# Patient Record
Sex: FEMALE | Race: ASIAN | NOT HISPANIC OR LATINO | URBAN - METROPOLITAN AREA
[De-identification: names, ages, dates, MRNs, and addresses within clinical notes are randomized per-mention and may not be internally consistent; named-entity substitution may affect disease eponyms.]

---

## 2019-05-20 ENCOUNTER — EMERGENCY (EMERGENCY)
Facility: HOSPITAL | Age: 24
LOS: 1 days | Discharge: HOME | End: 2019-05-20
Attending: EMERGENCY MEDICINE | Admitting: EMERGENCY MEDICINE
Payer: MEDICAID

## 2019-05-20 VITALS
DIASTOLIC BLOOD PRESSURE: 70 MMHG | OXYGEN SATURATION: 100 % | TEMPERATURE: 98 F | RESPIRATION RATE: 16 BRPM | SYSTOLIC BLOOD PRESSURE: 115 MMHG | HEART RATE: 95 BPM

## 2019-05-20 DIAGNOSIS — R69 ILLNESS, UNSPECIFIED: ICD-10-CM

## 2019-05-20 DIAGNOSIS — F43.25 ADJUSTMENT DISORDER WITH MIXED DISTURBANCE OF EMOTIONS AND CONDUCT: ICD-10-CM

## 2019-05-20 LAB
ALBUMIN SERPL ELPH-MCNC: 4.2 G/DL — SIGNIFICANT CHANGE UP (ref 3.3–5)
ALP SERPL-CCNC: 102 U/L — SIGNIFICANT CHANGE UP (ref 40–120)
ALT FLD-CCNC: 16 U/L — SIGNIFICANT CHANGE UP (ref 4–33)
ANION GAP SERPL CALC-SCNC: 12 MMO/L — SIGNIFICANT CHANGE UP (ref 7–14)
APAP SERPL-MCNC: < 15 UG/ML — LOW (ref 15–25)
AST SERPL-CCNC: 14 U/L — SIGNIFICANT CHANGE UP (ref 4–32)
BASOPHILS # BLD AUTO: 0.04 K/UL — SIGNIFICANT CHANGE UP (ref 0–0.2)
BASOPHILS NFR BLD AUTO: 0.3 % — SIGNIFICANT CHANGE UP (ref 0–2)
BILIRUB SERPL-MCNC: 0.2 MG/DL — SIGNIFICANT CHANGE UP (ref 0.2–1.2)
BUN SERPL-MCNC: 13 MG/DL — SIGNIFICANT CHANGE UP (ref 7–23)
CALCIUM SERPL-MCNC: 9.6 MG/DL — SIGNIFICANT CHANGE UP (ref 8.4–10.5)
CHLORIDE SERPL-SCNC: 102 MMOL/L — SIGNIFICANT CHANGE UP (ref 98–107)
CO2 SERPL-SCNC: 24 MMOL/L — SIGNIFICANT CHANGE UP (ref 22–31)
CREAT SERPL-MCNC: 0.74 MG/DL — SIGNIFICANT CHANGE UP (ref 0.5–1.3)
EOSINOPHIL # BLD AUTO: 0.18 K/UL — SIGNIFICANT CHANGE UP (ref 0–0.5)
EOSINOPHIL NFR BLD AUTO: 1.4 % — SIGNIFICANT CHANGE UP (ref 0–6)
ETHANOL BLD-MCNC: < 10 MG/DL — SIGNIFICANT CHANGE UP
GLUCOSE SERPL-MCNC: 115 MG/DL — HIGH (ref 70–99)
HCT VFR BLD CALC: 35.5 % — SIGNIFICANT CHANGE UP (ref 34.5–45)
HGB BLD-MCNC: 11.3 G/DL — LOW (ref 11.5–15.5)
IMM GRANULOCYTES NFR BLD AUTO: 0.5 % — SIGNIFICANT CHANGE UP (ref 0–1.5)
LYMPHOCYTES # BLD AUTO: 1.86 K/UL — SIGNIFICANT CHANGE UP (ref 1–3.3)
LYMPHOCYTES # BLD AUTO: 14.2 % — SIGNIFICANT CHANGE UP (ref 13–44)
MAGNESIUM SERPL-MCNC: 2.2 MG/DL — SIGNIFICANT CHANGE UP (ref 1.6–2.6)
MCHC RBC-ENTMCNC: 26.5 PG — LOW (ref 27–34)
MCHC RBC-ENTMCNC: 31.8 % — LOW (ref 32–36)
MCV RBC AUTO: 83.3 FL — SIGNIFICANT CHANGE UP (ref 80–100)
MONOCYTES # BLD AUTO: 0.93 K/UL — HIGH (ref 0–0.9)
MONOCYTES NFR BLD AUTO: 7.1 % — SIGNIFICANT CHANGE UP (ref 2–14)
NEUTROPHILS # BLD AUTO: 9.99 K/UL — HIGH (ref 1.8–7.4)
NEUTROPHILS NFR BLD AUTO: 76.5 % — SIGNIFICANT CHANGE UP (ref 43–77)
NRBC # FLD: 0 K/UL — SIGNIFICANT CHANGE UP (ref 0–0)
PHOSPHATE SERPL-MCNC: 3 MG/DL — SIGNIFICANT CHANGE UP (ref 2.5–4.5)
PLATELET # BLD AUTO: 337 K/UL — SIGNIFICANT CHANGE UP (ref 150–400)
PMV BLD: 9.9 FL — SIGNIFICANT CHANGE UP (ref 7–13)
POTASSIUM SERPL-MCNC: 4.1 MMOL/L — SIGNIFICANT CHANGE UP (ref 3.5–5.3)
POTASSIUM SERPL-SCNC: 4.1 MMOL/L — SIGNIFICANT CHANGE UP (ref 3.5–5.3)
PROT SERPL-MCNC: 8.3 G/DL — SIGNIFICANT CHANGE UP (ref 6–8.3)
RBC # BLD: 4.26 M/UL — SIGNIFICANT CHANGE UP (ref 3.8–5.2)
RBC # FLD: 14.8 % — HIGH (ref 10.3–14.5)
SALICYLATES SERPL-MCNC: < 5 MG/DL — LOW (ref 15–30)
SODIUM SERPL-SCNC: 138 MMOL/L — SIGNIFICANT CHANGE UP (ref 135–145)
TSH SERPL-MCNC: 1.97 UIU/ML — SIGNIFICANT CHANGE UP (ref 0.27–4.2)
WBC # BLD: 13.06 K/UL — HIGH (ref 3.8–10.5)
WBC # FLD AUTO: 13.06 K/UL — HIGH (ref 3.8–10.5)

## 2019-05-20 PROCEDURE — 90792 PSYCH DIAG EVAL W/MED SRVCS: CPT

## 2019-05-20 PROCEDURE — 99283 EMERGENCY DEPT VISIT LOW MDM: CPT | Mod: 25

## 2019-05-20 NOTE — ED ADULT NURSE NOTE - CHIEF COMPLAINT QUOTE
pt BIBEMS s/p  calling 911 after they got into an argument and pt held a knife to her wrist threatening to cut herself. pt has made same attempt in the past and was admitted to a hospital in [place] 7 months ago. however has no diagnosed psych hx. pt denies SI/HI/AH/VH/Drug/ETOH use. pt noted with no injuries at present.

## 2019-05-20 NOTE — ED PROVIDER NOTE - OBJECTIVE STATEMENT
23 year old female arrived with EMS from home after threatening to cut her self with a knife today.  Patient got in an argument with her  and was noted to grab a knife and with an act to cut herself.  Pt arrived with EMS calm and cooperative, stating that she wanted to stop her  from leaving.  Pt reports no homicidal/suicidal ideation. No visual or homicidal ideations.  Reports being admitted to a hospital in [place] after cutting herself this past year.    No fever/chills, No photophobia/eye pain/changes in vision, No ear pain/sore throat/dysphagia, No chest pain/palpitations, no SOB/cough/wheeze/stridor, No abdominal pain, No N/V/D, no dysuria/frequency/discharge, No neck/back pain, no rash, no changes in neurological status/function. 23 year old female arrived with EMS from home after threatening to cut her self with a knife today.  Patient got in an argument with her  and was noted to grab a knife and with an act to cut herself.  Pt arrived with EMS calm and cooperative, stating that she wanted to stop her  from leaving.  Pt reports no homicidal/suicidal ideation. No visual or homicidal ideations.  Reports being admitted to a hospital in [place] after cutting herself this past year.  Pt reports sleeping well, with no issues, reports good appetite. Reports feeling depressed because she left her mother in [facility].   No fever/chills, No photophobia/eye pain/changes in vision, No ear pain/sore throat/dysphagia, No chest pain/palpitations, no SOB/cough/wheeze/stridor, No abdominal pain, No N/V/D, no dysuria/frequency/discharge, No neck/back pain, no rash, no changes in neurological status/function.    7 months ago patient was admitted for suicide attempt after cutting her self with a knife on the right forearm.

## 2019-05-20 NOTE — ED BEHAVIORAL HEALTH ASSESSMENT NOTE - SUMMARY
Patient is a 23 year old  Latvian female; domiciled with ; noncaregiver; homemaker; PPH of depression; one prior hospitalizations 7 months ago in [place] after a suicide attempt by overdose in context of [place] and arguing w ;; no known history of violence or arrests; no active substance abuse or known history of complicated withdrawal; no PMH; brought in by EMS; called by  ; presenting with threatening to cut wrist with a knife; in the setting of being upset that  would not buy her an airline ticket. On interview, the patient is calm and cooperative, with reactive affect and linear thought process. , denies SI/HI, delusions/AVH. Patient exhibited maladaptive coping and made manipulative suicidal threat with low intent. No evidence of acute mood disorder, depression or psychosis,

## 2019-05-20 NOTE — ED PROVIDER NOTE - CLINICAL SUMMARY MEDICAL DECISION MAKING FREE TEXT BOX
22 yo F with history of prev psych admission admitted to [facility] 2 weeks and 7 months in [place] for cutting and depression arrived with threatening to cut herself after an argument with her .  - labs, ekg,   - psych consult

## 2019-05-20 NOTE — ED ADULT NURSE NOTE - OBJECTIVE STATEMENT
BIBEMS s/p argument with her  at home where she got so upset that she pulled a knife from the kitchen and held it to her wrist and threatened to hurt herself. Was seen in [facility] recently for same threat. At present patient denies SI/HI, AH/VH, ETOH/substance abuse. Patient A/Ox4, admitted to hospitals twice in the past 7 months for 2 weeks, diagnosed with depression and is non compliant with her medications at home. MD Aiken has seen the patient, waiting to be seen by psychiatry. Safety maintained, needs attended, will continue to monitor.

## 2019-05-20 NOTE — ED BEHAVIORAL HEALTH ASSESSMENT NOTE - RISK ASSESSMENT
acute risk is is low. chronic risk factors- maladaptive coping style, immigrant status , past psychiatric admission. protective factors-future oriented (wants to go to college and wants to go home to [facility],, wants to see parents), responsibility to parent's, Mormonism, no access to guns or weapons. Future Risk could potentially increase in setting of marital dysharmony, Risk mitigated by referral to crisis clinic and safety planning.

## 2019-05-20 NOTE — ED BEHAVIORAL HEALTH ASSESSMENT NOTE - SUICIDE PROTECTIVE FACTORS
Future oriented/Fear of death or dying due to pain/suffering/Supportive social network or family/Identifies reasons for living/Responsibility to family and others/High spirituality

## 2019-05-20 NOTE — ED ADULT TRIAGE NOTE - CHIEF COMPLAINT QUOTE
pt BIBEMS s/p  calling 911 after they got into an argument and pt held a knife to her wrist threatening to cut herself. pt has made same attempt in the past and was admitted to a hospital in [place] 7 months ago. however has no diagnosed psych hx. pt denies SI/HI/AH/VH/Drug/ETOH use. pt noted with no injuries at present.

## 2019-05-20 NOTE — ED BEHAVIORAL HEALTH ASSESSMENT NOTE - OTHER PAST PSYCHIATRIC HISTORY (INCLUDE DETAILS REGARDING ONSET, COURSE OF ILLNESS, INPATIENT/OUTPATIENT TREATMENT)
one prior hospitalizations 7 months ago in [place] after a suicide attempt by overdose in context of difficulty [place] and arguing w

## 2019-05-20 NOTE — ED BEHAVIORAL HEALTH ASSESSMENT NOTE - DESCRIPTION
no see hpi Calm and cooperativeICU Vital Signs Last 24 Hrs  T(C): 36.9 (20 May 2019 00:11), Max: 36.9 (20 May 2019 00:11)  T(F): 98.4 (20 May 2019 00:11), Max: 98.4 (20 May 2019 00:11)  HR: 95 (20 May 2019 00:11) (95 - 95)  BP: 115/70 (20 May 2019 00:11) (115/70 - 115/70)  BP(mean): --  ABP: --  ABP(mean): --  RR: 16 (20 May 2019 00:11) (16 - 16)  SpO2: 100% (20 May 2019 00:11) (100% - 100%)

## 2019-05-20 NOTE — ED PROVIDER NOTE - NSFOLLOWUPINSTRUCTIONS_ED_ALL_ED_FT
-- Please follow-up with your doctor(s) within the next 3 days, but see medical sooner if your symptoms persist or worsen.  Please call tomorrow for an appointment.  If you cannot follow-up with your primary doctor please return to the ED for any urgent issues.  -- You were given a copy of your labs and imaging.  Please go-over these with your doctor(s).   -- If you have any suicidal or homicidal ideations, hallucinations, or any worsening of symptoms or any other concerns please see your doctor or return to the ED immediately.  -- Please continue taking your home medications as directed.  Do not use alcohol when taking any medication.
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
## 2019-05-20 NOTE — ED PROVIDER NOTE - PROGRESS NOTE DETAILS
Griselda, [phone]: discussed with  reports she cut her hand with a knife.  Reports patient wants to go back to [facility] and because a ticket was not book she attempted to cut herself with a knife.  A month ago was admitted to [facility] for cutting herself (2 weeks).  Currently non-compliant with medication from [facility].  thinks she was diagnosed depression. Gong: Patient continues to deny attempting to hurt herself.  SW discussed with  who is willing to come pick her up and care for her at this time.  Patient seen and evaluated by psych with low risk for self harm at this time..

## 2019-05-20 NOTE — ED BEHAVIORAL HEALTH NOTE - BEHAVIORAL HEALTH NOTE
SW attempted to contact pt's , Kristin Villalobos, pt states his phone # is [phone].  SW contacted this number, however message came on stating that incoming calls are not accepted at the prescriber's request. SW attempted to contact pt's , Kristin Villalobos, pt states his phone # is [phone].  SW contacted this number, however message came on stating that incoming calls are not accepted at the prescriber's request.    SW reached pt's  at [phone].  Discussed pt d/c- reports he will come to the hospital to  pt.   was concerned about pt and what to do if she acts this way again.  Sw advised pt's  to contact 911 if he feels as though pt may be at risk of harm to herself or others.  Pt's  verbalized agreement and reports that he will accompany pt home.

## 2022-01-01 NOTE — ED ADULT NURSE NOTE - TEMPLATE
Medication information (what to hold and what to take)   -- Pediatric NPO instructions as follows: (or as per your Surgeon)  --Stop ALL solid food, milk,gum, candy (including vitamins) 8 hours before surgery/procedure time.  --The patient should be ENCOURAGED to drink water and carbohydrate-rich clear liquids (sports drinks, clear juices,pedialyte) until 2 hours prior to surgery/procedure time.  --If you are told to take medication on the morning of surgery, it may be taken with a sip of water.   --Instructed to avoid vitamins,supplements,aspirin and ibuprofen until after procedure     -- Arrival place and directions given - [facility] - 0715  -- Bathing with antibacterial/regular soap   -- Don't wear any jewelry or bring any valuables AM of surgery   -- No makeup or moisturizer to face   -- No perfume/cologne/aftershave, powder, lotions, creams    Pt's Mother denies any family history of Anesthesia complications.     Patient's Mom:  Verbalized understanding.   Was given an arrival time of  per surgeon's office  Will accompany patient to the hospital       Psych/Behavioral

## 2022-03-24 NOTE — ED BEHAVIORAL HEALTH ASSESSMENT NOTE - ACCOMPANIED BY
[FreeTextEntry1] : 41 yo P3 here for PO check after [facility] 1/21/22\par No complaints\par \par 
Patient
Self
